# Patient Record
Sex: FEMALE
[De-identification: names, ages, dates, MRNs, and addresses within clinical notes are randomized per-mention and may not be internally consistent; named-entity substitution may affect disease eponyms.]

---

## 2023-01-18 ENCOUNTER — RX ONLY (OUTPATIENT)
Age: 28
Setting detail: RX ONLY
End: 2023-01-18

## 2023-04-13 ENCOUNTER — APPOINTMENT (RX ONLY)
Dept: URBAN - METROPOLITAN AREA CLINIC 77 | Facility: CLINIC | Age: 28
Setting detail: DERMATOLOGY
End: 2023-04-13

## 2023-04-13 DIAGNOSIS — L71.8 OTHER ROSACEA: ICD-10-CM

## 2023-04-13 PROCEDURE — ? PRESCRIPTION MEDICATION MANAGEMENT

## 2023-04-13 PROCEDURE — ? CHRONOLOGY OF PRESENT ILLNESS

## 2023-04-13 PROCEDURE — ? PRESCRIPTION

## 2023-04-13 PROCEDURE — ? COUNSELING

## 2023-04-13 PROCEDURE — 99204 OFFICE O/P NEW MOD 45 MIN: CPT

## 2023-04-13 RX ORDER — METRONIDAZOLE 7.5 MG/G
THIN LAYER CREAM TOPICAL BID
Qty: 45 | Refills: 2 | Status: ERX | COMMUNITY
Start: 2023-04-13

## 2023-04-13 RX ADMIN — METRONIDAZOLE THIN LAYER: 7.5 CREAM TOPICAL at 00:00

## 2023-04-13 ASSESSMENT — LOCATION ZONE DERM: LOCATION ZONE: FACE

## 2023-04-13 ASSESSMENT — LOCATION DETAILED DESCRIPTION DERM: LOCATION DETAILED: LEFT INFERIOR CENTRAL MALAR CHEEK

## 2023-04-13 ASSESSMENT — LOCATION SIMPLE DESCRIPTION DERM: LOCATION SIMPLE: LEFT CHEEK

## 2023-04-13 NOTE — PROCEDURE: PRESCRIPTION MEDICATION MANAGEMENT
Render In Strict Bullet Format?: No
Detail Level: Zone
Initiate Treatment: metronidazole 0.75 % topical cream BID\\nQuantity: 45.0 g  Days Supply: 30\\nSig: Apply thin layer to entire face on damp skin BID

## 2023-04-13 NOTE — PROCEDURE: CHRONOLOGY OF PRESENT ILLNESS
Detail Level: Zone
Chronology Of Present Illness: 4/13/23\\nPatient presents for evaluation of her acne today. Patient reports flares primarily on her face and occasionally her shoulders. Patient has not yet used any prescriptions to treat but has tried numerous OTC treatment. Patient reports burning and irritation with salicylic acid products. Patient has combo skin and reports today is a good day, photos taken. Discussed oral antibiotics for a few months as well as topical treatment. Patient reports she is pregnant. Will treat with metronidazole BID to start. Discussed azelaic acid as well but patient has sensitive skin, may consider in the future.  Will f/u in 6 weeks

## 2023-05-25 ENCOUNTER — APPOINTMENT (RX ONLY)
Dept: URBAN - METROPOLITAN AREA CLINIC 77 | Facility: CLINIC | Age: 28
Setting detail: DERMATOLOGY
End: 2023-05-25

## 2023-05-25 DIAGNOSIS — L71.8 OTHER ROSACEA: ICD-10-CM

## 2023-05-25 PROCEDURE — ? PRESCRIPTION

## 2023-05-25 PROCEDURE — ? CHRONOLOGY OF PRESENT ILLNESS

## 2023-05-25 PROCEDURE — 99214 OFFICE O/P EST MOD 30 MIN: CPT

## 2023-05-25 PROCEDURE — ? PRESCRIPTION MEDICATION MANAGEMENT

## 2023-05-25 PROCEDURE — ? COUNSELING

## 2023-05-25 RX ORDER — AZELAIC ACID 0.15 G/G
PEA SIZE AMOUNT GEL TOPICAL QD
Qty: 50 | Refills: 3 | Status: ERX | COMMUNITY
Start: 2023-05-25

## 2023-05-25 RX ADMIN — AZELAIC ACID PEA SIZE AMOUNT: 0.15 GEL TOPICAL at 00:00

## 2023-05-25 ASSESSMENT — LOCATION DETAILED DESCRIPTION DERM: LOCATION DETAILED: LEFT INFERIOR CENTRAL MALAR CHEEK

## 2023-05-25 ASSESSMENT — LOCATION ZONE DERM: LOCATION ZONE: FACE

## 2023-05-25 ASSESSMENT — LOCATION SIMPLE DESCRIPTION DERM: LOCATION SIMPLE: LEFT CHEEK

## 2023-05-25 NOTE — PROCEDURE: PRESCRIPTION MEDICATION MANAGEMENT
Render In Strict Bullet Format?: No
Continue Regimen: metronidazole 0.75 % topical cream BID
Detail Level: Zone
Initiate Treatment: Azelaic acid QD

## 2023-05-25 NOTE — PROCEDURE: CHRONOLOGY OF PRESENT ILLNESS
Detail Level: Zone
Chronology Of Present Illness: 4/13/23\\nPatient presents for evaluation of her acne today. Patient reports flares primarily on her face and occasionally her shoulders. Patient has not yet used any prescriptions to treat but has tried numerous OTC treatment. Patient reports burning and irritation with salicylic acid products. Patient has combo skin and reports today is a good day, photos taken. Discussed oral antibiotics for a few months as well as topical treatment. Patient reports she is pregnant. Will treat with metronidazole BID to start. Discussed azelaic acid as well but patient has sensitive skin, may consider in the future. Will f/u in 6 weeks. \\n\\n5/25/23\\nPt condition has improved upon exam but is still experiencing redness distributed on the cheeks, nose, and chin. Advised pt to continue using metronidazole cream BID but will also prescribe azelaic acid to add to pt regimen QD. Pt to f/u in 2 months.